# Patient Record
Sex: MALE | Race: WHITE | NOT HISPANIC OR LATINO
[De-identification: names, ages, dates, MRNs, and addresses within clinical notes are randomized per-mention and may not be internally consistent; named-entity substitution may affect disease eponyms.]

---

## 2017-06-16 PROBLEM — Z00.00 ENCOUNTER FOR PREVENTIVE HEALTH EXAMINATION: Status: ACTIVE | Noted: 2017-06-16

## 2017-06-22 ENCOUNTER — APPOINTMENT (OUTPATIENT)
Dept: OPHTHALMOLOGY | Facility: CLINIC | Age: 42
End: 2017-06-22

## 2017-06-22 DIAGNOSIS — H53.8 OTHER VISUAL DISTURBANCES: ICD-10-CM

## 2017-06-22 DIAGNOSIS — R73.03 PREDIABETES.: ICD-10-CM

## 2017-06-22 DIAGNOSIS — F31.30 BIPOLAR DISORDER, CURRENT EPISODE DEPRESSED, MILD OR MODERATE SEVERITY, UNSPECIFIED: ICD-10-CM

## 2017-06-22 DIAGNOSIS — I10 ESSENTIAL (PRIMARY) HYPERTENSION: ICD-10-CM

## 2017-06-22 DIAGNOSIS — Z78.9 OTHER SPECIFIED HEALTH STATUS: ICD-10-CM

## 2017-06-22 DIAGNOSIS — L71.9 ROSACEA, UNSPECIFIED: ICD-10-CM

## 2017-06-22 DIAGNOSIS — N48.30 PRIAPISM, UNSPECIFIED: ICD-10-CM

## 2017-08-04 ENCOUNTER — TRANSCRIPTION ENCOUNTER (OUTPATIENT)
Age: 42
End: 2017-08-04

## 2018-06-15 ENCOUNTER — APPOINTMENT (OUTPATIENT)
Dept: OPHTHALMOLOGY | Facility: CLINIC | Age: 43
End: 2018-06-15
Payer: MEDICARE

## 2018-06-15 DIAGNOSIS — H16.062: ICD-10-CM

## 2018-06-15 DIAGNOSIS — H17.9 UNSPECIFIED CORNEAL SCAR AND OPACITY: ICD-10-CM

## 2018-06-15 PROCEDURE — 92285 EXTERNAL OCULAR PHOTOGRAPHY: CPT

## 2018-06-15 PROCEDURE — 92132 CPTRZD OPH DX IMG ANT SGM: CPT

## 2018-06-15 PROCEDURE — 92014 COMPRE OPH EXAM EST PT 1/>: CPT

## 2018-06-15 RX ORDER — VALACYCLOVIR 1 G/1
1 TABLET, FILM COATED ORAL DAILY
Qty: 30 | Refills: 5 | Status: ACTIVE | COMMUNITY
Start: 2018-06-15 | End: 1900-01-01

## 2018-06-15 RX ORDER — FUROSEMIDE 80 MG/1
TABLET ORAL
Refills: 0 | Status: ACTIVE | COMMUNITY

## 2018-06-15 RX ORDER — DOXYCYCLINE 50 MG/1
50 CAPSULE ORAL
Qty: 1 | Refills: 5 | Status: ACTIVE | COMMUNITY
Start: 2018-06-15 | End: 1900-01-01

## 2018-08-10 ENCOUNTER — MEDICATION RENEWAL (OUTPATIENT)
Age: 43
End: 2018-08-10

## 2018-08-10 RX ORDER — OFLOXACIN 3 MG/ML
0.3 SOLUTION/ DROPS OPHTHALMIC
Qty: 1 | Refills: 3 | Status: ACTIVE | COMMUNITY
Start: 2018-08-10 | End: 1900-01-01

## 2018-08-10 RX ORDER — PREDNISOLONE ACETATE 10 MG/ML
1 SUSPENSION/ DROPS OPHTHALMIC
Qty: 1 | Refills: 3 | Status: ACTIVE | COMMUNITY
Start: 2018-08-10 | End: 1900-01-01

## 2018-08-20 ENCOUNTER — OUTPATIENT (OUTPATIENT)
Dept: OUTPATIENT SERVICES | Facility: HOSPITAL | Age: 43
LOS: 1 days | End: 2018-08-20
Payer: MEDICARE

## 2018-08-20 ENCOUNTER — APPOINTMENT (OUTPATIENT)
Dept: OPHTHALMOLOGY | Facility: HOSPITAL | Age: 43
End: 2018-08-20

## 2018-08-20 DIAGNOSIS — Z98.890 OTHER SPECIFIED POSTPROCEDURAL STATES: Chronic | ICD-10-CM

## 2018-08-20 DIAGNOSIS — Z41.9 ENCOUNTER FOR PROCEDURE FOR PURPOSES OTHER THAN REMEDYING HEALTH STATE, UNSPECIFIED: Chronic | ICD-10-CM

## 2018-08-20 PROCEDURE — 82962 GLUCOSE BLOOD TEST: CPT

## 2018-08-21 ENCOUNTER — APPOINTMENT (OUTPATIENT)
Dept: OPHTHALMOLOGY | Facility: CLINIC | Age: 43
End: 2018-08-21

## 2018-08-24 DIAGNOSIS — H18.12 BULLOUS KERATOPATHY, LEFT EYE: ICD-10-CM

## 2018-08-28 ENCOUNTER — APPOINTMENT (OUTPATIENT)
Dept: OPHTHALMOLOGY | Facility: CLINIC | Age: 43
End: 2018-08-28

## 2018-11-02 PROBLEM — N48.30 PRIAPISM, UNSPECIFIED: Chronic | Status: ACTIVE | Noted: 2018-08-20

## 2018-11-08 ENCOUNTER — OUTPATIENT (OUTPATIENT)
Dept: OUTPATIENT SERVICES | Facility: HOSPITAL | Age: 43
LOS: 1 days | End: 2018-11-08
Payer: MEDICARE

## 2018-11-08 VITALS
HEIGHT: 71 IN | SYSTOLIC BLOOD PRESSURE: 121 MMHG | HEART RATE: 71 BPM | DIASTOLIC BLOOD PRESSURE: 83 MMHG | TEMPERATURE: 98 F | RESPIRATION RATE: 16 BRPM | OXYGEN SATURATION: 97 % | WEIGHT: 315 LBS

## 2018-11-08 DIAGNOSIS — H02.89 OTHER SPECIFIED DISORDERS OF EYELID: ICD-10-CM

## 2018-11-08 DIAGNOSIS — Z98.890 OTHER SPECIFIED POSTPROCEDURAL STATES: Chronic | ICD-10-CM

## 2018-11-08 DIAGNOSIS — L71.9 ROSACEA, UNSPECIFIED: ICD-10-CM

## 2018-11-08 DIAGNOSIS — Z41.9 ENCOUNTER FOR PROCEDURE FOR PURPOSES OTHER THAN REMEDYING HEALTH STATE, UNSPECIFIED: Chronic | ICD-10-CM

## 2018-11-08 DIAGNOSIS — Z01.818 ENCOUNTER FOR OTHER PREPROCEDURAL EXAMINATION: ICD-10-CM

## 2018-11-08 DIAGNOSIS — G47.30 SLEEP APNEA, UNSPECIFIED: ICD-10-CM

## 2018-11-08 DIAGNOSIS — K21.9 GASTRO-ESOPHAGEAL REFLUX DISEASE WITHOUT ESOPHAGITIS: ICD-10-CM

## 2018-11-08 DIAGNOSIS — I10 ESSENTIAL (PRIMARY) HYPERTENSION: ICD-10-CM

## 2018-11-08 LAB
ANION GAP SERPL CALC-SCNC: 13 MMOL/L — SIGNIFICANT CHANGE UP (ref 5–17)
BUN SERPL-MCNC: 10 MG/DL — SIGNIFICANT CHANGE UP (ref 7–23)
CALCIUM SERPL-MCNC: 9.4 MG/DL — SIGNIFICANT CHANGE UP (ref 8.4–10.5)
CHLORIDE SERPL-SCNC: 106 MMOL/L — SIGNIFICANT CHANGE UP (ref 96–108)
CO2 SERPL-SCNC: 23 MMOL/L — SIGNIFICANT CHANGE UP (ref 22–31)
CREAT SERPL-MCNC: 0.88 MG/DL — SIGNIFICANT CHANGE UP (ref 0.5–1.3)
GLUCOSE SERPL-MCNC: 104 MG/DL — HIGH (ref 70–99)
HCT VFR BLD CALC: 44.1 % — SIGNIFICANT CHANGE UP (ref 39–50)
HGB BLD-MCNC: 15.3 G/DL — SIGNIFICANT CHANGE UP (ref 13–17)
MCHC RBC-ENTMCNC: 30.8 PG — SIGNIFICANT CHANGE UP (ref 27–34)
MCHC RBC-ENTMCNC: 34.7 GM/DL — SIGNIFICANT CHANGE UP (ref 32–36)
MCV RBC AUTO: 88.9 FL — SIGNIFICANT CHANGE UP (ref 80–100)
PLATELET # BLD AUTO: 305 K/UL — SIGNIFICANT CHANGE UP (ref 150–400)
POTASSIUM SERPL-MCNC: 4 MMOL/L — SIGNIFICANT CHANGE UP (ref 3.5–5.3)
POTASSIUM SERPL-SCNC: 4 MMOL/L — SIGNIFICANT CHANGE UP (ref 3.5–5.3)
RBC # BLD: 4.96 M/UL — SIGNIFICANT CHANGE UP (ref 4.2–5.8)
RBC # FLD: 13.8 % — SIGNIFICANT CHANGE UP (ref 10.3–14.5)
SODIUM SERPL-SCNC: 142 MMOL/L — SIGNIFICANT CHANGE UP (ref 135–145)
WBC # BLD: 10.54 K/UL — HIGH (ref 3.8–10.5)
WBC # FLD AUTO: 10.54 K/UL — HIGH (ref 3.8–10.5)

## 2018-11-08 PROCEDURE — 80048 BASIC METABOLIC PNL TOTAL CA: CPT

## 2018-11-08 PROCEDURE — 85027 COMPLETE CBC AUTOMATED: CPT

## 2018-11-08 PROCEDURE — G0463: CPT

## 2018-11-08 RX ORDER — SODIUM CHLORIDE 9 MG/ML
3 INJECTION INTRAMUSCULAR; INTRAVENOUS; SUBCUTANEOUS EVERY 8 HOURS
Qty: 0 | Refills: 0 | Status: DISCONTINUED | OUTPATIENT
Start: 2018-11-13 | End: 2018-11-28

## 2018-11-08 RX ORDER — LIDOCAINE HCL 20 MG/ML
0.2 VIAL (ML) INJECTION ONCE
Qty: 0 | Refills: 0 | Status: DISCONTINUED | OUTPATIENT
Start: 2018-11-13 | End: 2018-11-28

## 2018-11-08 NOTE — H&P PST ADULT - PROBLEM SELECTOR PLAN 1
Bilateral upper eyelid resection   PSt instruction given, CBC,BMP drawn sent pending result   Has own appointment with PMD 11/12/2018 for medical evaluation preop will fax to [facility] recent echocardiogram and latest  EKG done from Dr Henry office   Case discussed with Dr Lebron< BMI 53 , ok to do surgery in ambulatory

## 2018-11-08 NOTE — H&P PST ADULT - ENMT COMMENTS
hx eyelid disorder and c/o blurry vision at times surgical scar from trach tube well healed  speech clear

## 2018-11-08 NOTE — H&P PST ADULT - RESPIRATORY AND THORAX COMMENTS
hx of respiratory disorder been intubated with tracheostomy Sept 2017 treated able to recovered been stable since

## 2018-11-08 NOTE — H&P PST ADULT - HISTORY OF PRESENT ILLNESS
42 y/o male with hx of morbid obesity , sleep apnea on CPAP, HTN , Bipolar disorder, GERD, Neuropathy on lower legs, rosecea takes antibiotics, hx of respiratory failure Sept 2017 admitted to hospital was intubated and had tracheostomy September with gastrostomy tube , recovered stayed in rehabilitation center and was discharge January 2018 as per patient. Patient has hx of eyelid discomfort which affects his eyesight , Patient was previously scheduled surgery at [facility] was cancelled. Referred to Dr Paz , re-evaluated the patient and now scheduled this procedure for treatment.

## 2018-11-08 NOTE — H&P PST ADULT - ATTENDING COMMENTS
For bilateral upper eyelid shortening.  Risks,, benefits, options reviewed.  All questions answered.  Risks include bleeding, infection, wound dehisence, need for additional surgery etc.

## 2018-11-08 NOTE — H&P PST ADULT - PMH
Bipolar 1 disorder  on meds  GERD (gastroesophageal reflux disease)  on meds  H/O tracheostomy  Sept 2017 removed x few weeks as per patient  HTN (hypertension)  on meds  Morbid obesity  lost 100 lbs while in the hospital 2017  Priapism    Respiratory failure  hospitalized Aug 2017 - intubated Sept 2017 tracheostomy and gastrostomy tube which was removed stayed at rehab center until Jan 2018 been stable since  Rosacea  takes antibiotics daily  Sleep apnea  on CPAP

## 2018-11-09 PROBLEM — I10 ESSENTIAL (PRIMARY) HYPERTENSION: Chronic | Status: ACTIVE | Noted: 2018-08-20

## 2018-11-09 PROBLEM — L71.9 ROSACEA, UNSPECIFIED: Chronic | Status: ACTIVE | Noted: 2018-08-20

## 2018-11-09 PROBLEM — E11.9 TYPE 2 DIABETES MELLITUS WITHOUT COMPLICATIONS: Chronic | Status: INACTIVE | Noted: 2018-08-20 | Resolved: 2018-11-08

## 2018-11-09 PROBLEM — J96.90 RESPIRATORY FAILURE, UNSPECIFIED, UNSPECIFIED WHETHER WITH HYPOXIA OR HYPERCAPNIA: Chronic | Status: ACTIVE | Noted: 2018-08-20

## 2018-11-09 PROBLEM — F31.9 BIPOLAR DISORDER, UNSPECIFIED: Chronic | Status: ACTIVE | Noted: 2018-08-20

## 2018-11-09 PROBLEM — Z98.890 OTHER SPECIFIED POSTPROCEDURAL STATES: Chronic | Status: ACTIVE | Noted: 2018-08-20

## 2018-11-09 PROBLEM — K57.90 DIVERTICULOSIS OF INTESTINE, PART UNSPECIFIED, WITHOUT PERFORATION OR ABSCESS WITHOUT BLEEDING: Chronic | Status: INACTIVE | Noted: 2018-08-20 | Resolved: 2018-11-08

## 2018-11-09 PROBLEM — G47.30 SLEEP APNEA, UNSPECIFIED: Chronic | Status: ACTIVE | Noted: 2018-08-20

## 2018-11-09 PROBLEM — K63.5 POLYP OF COLON: Chronic | Status: INACTIVE | Noted: 2018-08-20 | Resolved: 2018-11-08

## 2018-11-09 PROBLEM — E66.01 MORBID (SEVERE) OBESITY DUE TO EXCESS CALORIES: Chronic | Status: ACTIVE | Noted: 2018-08-20

## 2018-11-09 PROBLEM — K21.9 GASTRO-ESOPHAGEAL REFLUX DISEASE WITHOUT ESOPHAGITIS: Chronic | Status: ACTIVE | Noted: 2018-08-20

## 2018-11-12 ENCOUNTER — TRANSCRIPTION ENCOUNTER (OUTPATIENT)
Age: 43
End: 2018-11-12

## 2018-11-13 ENCOUNTER — OUTPATIENT (OUTPATIENT)
Dept: OUTPATIENT SERVICES | Facility: HOSPITAL | Age: 43
LOS: 1 days | End: 2018-11-13
Payer: MEDICARE

## 2018-11-13 VITALS
SYSTOLIC BLOOD PRESSURE: 126 MMHG | OXYGEN SATURATION: 94 % | RESPIRATION RATE: 20 BRPM | DIASTOLIC BLOOD PRESSURE: 66 MMHG

## 2018-11-13 VITALS
OXYGEN SATURATION: 97 % | WEIGHT: 315 LBS | RESPIRATION RATE: 18 BRPM | DIASTOLIC BLOOD PRESSURE: 83 MMHG | HEIGHT: 71 IN | SYSTOLIC BLOOD PRESSURE: 161 MMHG | TEMPERATURE: 99 F | HEART RATE: 72 BPM

## 2018-11-13 DIAGNOSIS — Z98.890 OTHER SPECIFIED POSTPROCEDURAL STATES: Chronic | ICD-10-CM

## 2018-11-13 DIAGNOSIS — Z41.9 ENCOUNTER FOR PROCEDURE FOR PURPOSES OTHER THAN REMEDYING HEALTH STATE, UNSPECIFIED: Chronic | ICD-10-CM

## 2018-11-13 DIAGNOSIS — H02.89 OTHER SPECIFIED DISORDERS OF EYELID: ICD-10-CM

## 2018-11-13 PROCEDURE — 67966 REVISION OF EYELID: CPT | Mod: 50

## 2018-11-13 RX ORDER — ONDANSETRON 8 MG/1
4 TABLET, FILM COATED ORAL ONCE
Qty: 0 | Refills: 0 | Status: DISCONTINUED | OUTPATIENT
Start: 2018-11-13 | End: 2018-11-28

## 2018-11-13 RX ORDER — GABAPENTIN 400 MG/1
0 CAPSULE ORAL
Qty: 0 | Refills: 0 | COMMUNITY

## 2018-11-13 RX ORDER — DULOXETINE HYDROCHLORIDE 30 MG/1
1 CAPSULE, DELAYED RELEASE ORAL
Qty: 0 | Refills: 0 | COMMUNITY

## 2018-11-13 RX ORDER — ACETAMINOPHEN 500 MG
1000 TABLET ORAL ONCE
Qty: 0 | Refills: 0 | Status: COMPLETED | OUTPATIENT
Start: 2018-11-13 | End: 2018-11-13

## 2018-11-13 RX ORDER — SODIUM CHLORIDE 9 MG/ML
1000 INJECTION, SOLUTION INTRAVENOUS
Qty: 0 | Refills: 0 | Status: DISCONTINUED | OUTPATIENT
Start: 2018-11-13 | End: 2018-11-28

## 2018-11-13 RX ORDER — LITHIUM CARBONATE 300 MG/1
0 TABLET, EXTENDED RELEASE ORAL
Qty: 0 | Refills: 0 | COMMUNITY

## 2018-11-13 RX ORDER — CELECOXIB 200 MG/1
200 CAPSULE ORAL ONCE
Qty: 0 | Refills: 0 | Status: COMPLETED | OUTPATIENT
Start: 2018-11-13 | End: 2018-11-13

## 2018-11-13 RX ORDER — ATENOLOL 25 MG/1
1 TABLET ORAL
Qty: 0 | Refills: 0 | COMMUNITY

## 2018-11-13 RX ORDER — IBUPROFEN 200 MG
1 TABLET ORAL
Qty: 0 | Refills: 0 | COMMUNITY

## 2018-11-13 RX ORDER — CELECOXIB 200 MG/1
200 CAPSULE ORAL ONCE
Qty: 0 | Refills: 0 | Status: DISCONTINUED | OUTPATIENT
Start: 2018-11-13 | End: 2018-11-28

## 2018-11-13 RX ORDER — FAMOTIDINE 10 MG/ML
0 INJECTION INTRAVENOUS
Qty: 0 | Refills: 0 | COMMUNITY

## 2018-11-13 RX ADMIN — CELECOXIB 200 MILLIGRAM(S): 200 CAPSULE ORAL at 12:06

## 2018-11-13 RX ADMIN — Medication 1000 MILLIGRAM(S): at 12:05

## 2018-12-11 ENCOUNTER — RX RENEWAL (OUTPATIENT)
Age: 43
End: 2018-12-11

## 2021-10-06 PROBLEM — H17.9 RIGHT CORNEAL SCAR: Status: ACTIVE | Noted: 2017-06-22

## 2023-04-12 ENCOUNTER — NON-APPOINTMENT (OUTPATIENT)
Age: 48
End: 2023-04-12

## 2023-04-12 ENCOUNTER — APPOINTMENT (OUTPATIENT)
Dept: OPHTHALMOLOGY | Facility: CLINIC | Age: 48
End: 2023-04-12
Payer: MEDICARE

## 2023-04-12 PROCEDURE — 99204 OFFICE O/P NEW MOD 45 MIN: CPT

## 2023-06-27 ENCOUNTER — APPOINTMENT (OUTPATIENT)
Dept: OPHTHALMOLOGY | Facility: CLINIC | Age: 48
End: 2023-06-27
Payer: MEDICARE

## 2023-06-27 ENCOUNTER — NON-APPOINTMENT (OUTPATIENT)
Age: 48
End: 2023-06-27

## 2023-06-27 PROCEDURE — 92014 COMPRE OPH EXAM EST PT 1/>: CPT

## 2023-11-14 ENCOUNTER — NON-APPOINTMENT (OUTPATIENT)
Age: 48
End: 2023-11-14

## 2023-11-14 ENCOUNTER — APPOINTMENT (OUTPATIENT)
Dept: OPHTHALMOLOGY | Facility: CLINIC | Age: 48
End: 2023-11-14
Payer: MEDICARE

## 2023-11-14 PROCEDURE — 92025 CPTRIZED CORNEAL TOPOGRAPHY: CPT

## 2023-11-14 PROCEDURE — 92285 EXTERNAL OCULAR PHOTOGRAPHY: CPT

## 2023-11-14 PROCEDURE — 92136 OPHTHALMIC BIOMETRY: CPT

## 2023-11-14 PROCEDURE — 92014 COMPRE OPH EXAM EST PT 1/>: CPT | Mod: 25

## 2023-12-08 ENCOUNTER — APPOINTMENT (OUTPATIENT)
Dept: OPHTHALMOLOGY | Facility: CLINIC | Age: 48
End: 2023-12-08
Payer: MEDICARE

## 2023-12-08 ENCOUNTER — NON-APPOINTMENT (OUTPATIENT)
Age: 48
End: 2023-12-08

## 2023-12-08 PROCEDURE — 99215 OFFICE O/P EST HI 40 MIN: CPT | Mod: 25

## 2023-12-08 PROCEDURE — 92285 EXTERNAL OCULAR PHOTOGRAPHY: CPT

## 2024-01-09 ENCOUNTER — APPOINTMENT (OUTPATIENT)
Dept: OPHTHALMOLOGY | Facility: CLINIC | Age: 49
End: 2024-01-09
Payer: MEDICARE

## 2024-01-09 ENCOUNTER — NON-APPOINTMENT (OUTPATIENT)
Age: 49
End: 2024-01-09

## 2024-01-09 PROCEDURE — 92012 INTRM OPH EXAM EST PATIENT: CPT

## 2024-02-27 ENCOUNTER — NON-APPOINTMENT (OUTPATIENT)
Age: 49
End: 2024-02-27

## 2024-02-27 ENCOUNTER — APPOINTMENT (OUTPATIENT)
Dept: OPHTHALMOLOGY | Facility: CLINIC | Age: 49
End: 2024-02-27
Payer: MEDICARE

## 2024-02-27 PROCEDURE — 65450 TREATMENT OF CORNEAL LESION: CPT | Mod: LT

## 2024-02-27 PROCEDURE — 92012 INTRM OPH EXAM EST PATIENT: CPT | Mod: 25

## 2024-02-28 ENCOUNTER — APPOINTMENT (OUTPATIENT)
Dept: OPHTHALMOLOGY | Facility: HOSPITAL | Age: 49
End: 2024-02-28

## 2024-03-07 ENCOUNTER — APPOINTMENT (OUTPATIENT)
Dept: OPHTHALMOLOGY | Facility: CLINIC | Age: 49
End: 2024-03-07

## 2024-03-26 ENCOUNTER — APPOINTMENT (OUTPATIENT)
Dept: OPHTHALMOLOGY | Facility: CLINIC | Age: 49
End: 2024-03-26
Payer: MEDICARE

## 2024-03-26 ENCOUNTER — NON-APPOINTMENT (OUTPATIENT)
Age: 49
End: 2024-03-26

## 2024-03-26 PROCEDURE — 92012 INTRM OPH EXAM EST PATIENT: CPT | Mod: 24

## 2024-08-15 ENCOUNTER — APPOINTMENT (OUTPATIENT)
Dept: OPHTHALMOLOGY | Facility: CLINIC | Age: 49
End: 2024-08-15
Payer: MEDICARE

## 2024-08-15 ENCOUNTER — NON-APPOINTMENT (OUTPATIENT)
Age: 49
End: 2024-08-15

## 2024-08-15 PROCEDURE — 99215 OFFICE O/P EST HI 40 MIN: CPT | Mod: 25

## 2024-08-15 PROCEDURE — 92285 EXTERNAL OCULAR PHOTOGRAPHY: CPT

## 2024-10-16 ENCOUNTER — APPOINTMENT (OUTPATIENT)
Dept: ORTHOPEDIC SURGERY | Facility: CLINIC | Age: 49
End: 2024-10-16

## 2024-10-16 VITALS — WEIGHT: 315 LBS | BODY MASS INDEX: 44.1 KG/M2 | HEIGHT: 71 IN

## 2024-10-16 DIAGNOSIS — G56.22 LESION OF ULNAR NERVE, LEFT UPPER LIMB: ICD-10-CM

## 2024-10-16 PROCEDURE — 20550 NJX 1 TENDON SHEATH/LIGAMENT: CPT | Mod: LT

## 2024-10-16 PROCEDURE — 76942 ECHO GUIDE FOR BIOPSY: CPT | Mod: LT

## 2024-10-16 PROCEDURE — 99204 OFFICE O/P NEW MOD 45 MIN: CPT | Mod: 25

## 2024-10-18 PROBLEM — G56.22 CUBITAL TUNNEL SYNDROME, LEFT: Status: ACTIVE | Noted: 2024-10-18

## 2024-10-22 ENCOUNTER — OUTPATIENT (OUTPATIENT)
Dept: OUTPATIENT SERVICES | Facility: HOSPITAL | Age: 49
LOS: 1 days | End: 2024-10-22

## 2024-10-22 VITALS
RESPIRATION RATE: 16 BRPM | WEIGHT: 315 LBS | HEIGHT: 71 IN | HEART RATE: 86 BPM | SYSTOLIC BLOOD PRESSURE: 149 MMHG | OXYGEN SATURATION: 96 % | TEMPERATURE: 98 F | DIASTOLIC BLOOD PRESSURE: 85 MMHG

## 2024-10-22 DIAGNOSIS — H02.89 OTHER SPECIFIED DISORDERS OF EYELID: ICD-10-CM

## 2024-10-22 DIAGNOSIS — J44.9 CHRONIC OBSTRUCTIVE PULMONARY DISEASE, UNSPECIFIED: ICD-10-CM

## 2024-10-22 DIAGNOSIS — E66.01 MORBID (SEVERE) OBESITY DUE TO EXCESS CALORIES: ICD-10-CM

## 2024-10-22 DIAGNOSIS — Z98.890 OTHER SPECIFIED POSTPROCEDURAL STATES: Chronic | ICD-10-CM

## 2024-10-22 DIAGNOSIS — I10 ESSENTIAL (PRIMARY) HYPERTENSION: ICD-10-CM

## 2024-10-22 DIAGNOSIS — R73.09 OTHER ABNORMAL GLUCOSE: ICD-10-CM

## 2024-10-22 DIAGNOSIS — Z93.1 GASTROSTOMY STATUS: Chronic | ICD-10-CM

## 2024-10-22 DIAGNOSIS — G47.33 OBSTRUCTIVE SLEEP APNEA (ADULT) (PEDIATRIC): ICD-10-CM

## 2024-10-22 DIAGNOSIS — Z41.9 ENCOUNTER FOR PROCEDURE FOR PURPOSES OTHER THAN REMEDYING HEALTH STATE, UNSPECIFIED: Chronic | ICD-10-CM

## 2024-10-22 DIAGNOSIS — H02.004: ICD-10-CM

## 2024-10-22 RX ORDER — SODIUM CHLORIDE 9 MG/ML
3 INJECTION, SOLUTION INTRAMUSCULAR; INTRAVENOUS; SUBCUTANEOUS EVERY 8 HOURS
Refills: 0 | Status: DISCONTINUED | OUTPATIENT
Start: 2024-10-30 | End: 2024-11-13

## 2024-10-22 NOTE — H&P PST ADULT - PROBLEM SELECTOR PLAN 2
pt will take Atenolol AM of surgery as prescribed  pt seen by cardiology preop on 10/15/24 and no active cardiac contraindications

## 2024-10-22 NOTE — H&P PST ADULT - ASSESSMENT
50 y/o male with Morbid Obesity, Elevated A1c, HTN, Severe SUSAN on CPAP, mildly dilated aortic root, ascending aorta dilation, Acid reflux, Bipolar Disorder, Neuropathy, adrenal insufficiency, stable pulmonary nodules, COPD, h/o hypercapnic respiratory failure 7 years ago s/p tracheostomy with reversal and entropion of bilateral eyelids presents to PST preop for bilateral, upper eyelid wedge resection, bilateral upper eyelid entropion repair.

## 2024-10-22 NOTE — H&P PST ADULT - PROBLEM SELECTOR PLAN 1
preop for bilateral, upper eyelid wedge resection, bilateral upper eyelid entropion repair on 10/30/24  preop instructions given, pt verbalized understanding  pt will take prescribed famotidine for GI prophylaxis AM of surgery   labs results from8/2024 reviewed and in chart

## 2024-10-22 NOTE — H&P PST ADULT - OTHER CARE PROVIDERS
Pulmonary Dr. Devon Carcamo  Cardiology Dr. Ekaterina Jerome   Neurologist Dr. Jerry  Endocrinologist Dr. Katz

## 2024-10-22 NOTE — H&P PST ADULT - NEGATIVE NEUROLOGICAL SYMPTOMS
no transient paralysis/no weakness/no generalized seizures/no focal seizures/no syncope/no tremors/no loss of sensation/no difficulty walking/no headache/no loss of consciousness/no hemiparesis/no confusion/no facial palsy
Improved

## 2024-10-22 NOTE — H&P PST ADULT - NSICDXPASTSURGICALHX_GEN_ALL_CORE_FT
PAST SURGICAL HISTORY:  Elective surgery Peg inserted and removed 2017    H/O circumcision     H/O colonoscopy     H/O eye surgery     H/O tracheostomy Sept 2017 / removed x few weeks    S/P percutaneous endoscopic gastrostomy (PEG) tube placement

## 2024-10-22 NOTE — H&P PST ADULT - NSICDXPASTMEDICALHX_GEN_ALL_CORE_FT
PAST MEDICAL HISTORY:  Acid reflux     Ascending aorta dilation     Bipolar 1 disorder on meds    Current smoker     Dilated aortic root     Elevated hemoglobin A1c     GERD (gastroesophageal reflux disease) on meds    H/O adrenal insufficiency     H/O cardiomyopathy     H/O tracheostomy Sept 2017 removed x few weeks as per patient    HTN (hypertension) on meds    Lung nodules     Morbid obesity lost 100 lbs while in the hospital 2017    Neuropathy     Other specified disorders of eyelid     Priapism     Respiratory failure hospitalized Aug 2017 - intubated Sept 2017 tracheostomy and gastrostomy tube which was removed stayed at rehab center until Jan 2018 been stable since    Rosacea takes antibiotics daily    Sleep apnea on CPAP    Unspecified entropion of left eye, unspecified eyelid

## 2024-10-22 NOTE — H&P PST ADULT - RESPIRATORY AND THORAX COMMENTS
h/o mild COPD. On inhalers daily. denies exacerbation. h/o hypercapnic resp failure in 2017 s/p trach with reversal.  CT chest september 2024  showed several stabe pulmonary nodules. + SUSAN on CPAP.

## 2024-10-22 NOTE — H&P PST ADULT - PROBLEM SELECTOR PLAN 4
pt will hold Ozempic 1 week prior to surgery. His last dose was 10/18/24  accu check to be assessed on admission

## 2024-10-22 NOTE — H&P PST ADULT - PROBLEM SELECTOR PLAN 3
Pt will use Trelegy inhaler DOS and albuterol PRN  Pt seen by pulmonary preop on 10/17/24 and pulmonary status is stable

## 2024-10-29 NOTE — ASU PATIENT PROFILE, ADULT - BRADEN SCORE (IF 18 OR LESS ACTIVATE SKIN INJURY RISK INCREASED GUIDELINE), MLM
Danny Max in [facility], BPP 8/8, will update dr davis when she is available after a delivery.   23

## 2024-10-29 NOTE — ASU PATIENT PROFILE, ADULT - FALL HARM RISK - UNIVERSAL INTERVENTIONS
Bed in lowest position, wheels locked, appropriate side rails in place/Call bell, personal items and telephone in reach/Instruct patient to call for assistance before getting out of bed or chair/Non-slip footwear when patient is out of bed/[place] to call system/Physically safe environment - no spills, clutter or unnecessary equipment/Purposeful Proactive Rounding/Room/bathroom lighting operational, light cord in reach

## 2024-10-30 ENCOUNTER — OUTPATIENT (OUTPATIENT)
Dept: OUTPATIENT SERVICES | Facility: HOSPITAL | Age: 49
LOS: 1 days | Discharge: HOME | End: 2024-10-30
Payer: MEDICARE

## 2024-10-30 ENCOUNTER — APPOINTMENT (OUTPATIENT)
Dept: OPHTHALMOLOGY | Facility: HOSPITAL | Age: 49
End: 2024-10-30

## 2024-10-30 ENCOUNTER — TRANSCRIPTION ENCOUNTER (OUTPATIENT)
Age: 49
End: 2024-10-30

## 2024-10-30 VITALS
OXYGEN SATURATION: 94 % | HEART RATE: 79 BPM | DIASTOLIC BLOOD PRESSURE: 97 MMHG | WEIGHT: 315 LBS | HEIGHT: 71 IN | SYSTOLIC BLOOD PRESSURE: 148 MMHG | RESPIRATION RATE: 18 BRPM | TEMPERATURE: 98 F

## 2024-10-30 VITALS
RESPIRATION RATE: 18 BRPM | HEART RATE: 91 BPM | DIASTOLIC BLOOD PRESSURE: 97 MMHG | SYSTOLIC BLOOD PRESSURE: 152 MMHG | OXYGEN SATURATION: 98 %

## 2024-10-30 DIAGNOSIS — Z93.1 GASTROSTOMY STATUS: Chronic | ICD-10-CM

## 2024-10-30 DIAGNOSIS — H02.001: ICD-10-CM

## 2024-10-30 DIAGNOSIS — Z98.890 OTHER SPECIFIED POSTPROCEDURAL STATES: Chronic | ICD-10-CM

## 2024-10-30 DIAGNOSIS — Z41.9 ENCOUNTER FOR PROCEDURE FOR PURPOSES OTHER THAN REMEDYING HEALTH STATE, UNSPECIFIED: Chronic | ICD-10-CM

## 2024-10-30 LAB — GLUCOSE BLDC GLUCOMTR-MCNC: 123 MG/DL — HIGH (ref 70–99)

## 2024-10-30 PROCEDURE — 67921 REPAIR EYELID DEFECT: CPT | Mod: E1,E3

## 2024-10-30 PROCEDURE — 67961 REVISION OF EYELID: CPT | Mod: E1,E3

## 2024-10-30 RX ORDER — BIOTIN 100 %
1 POWDER (GRAM) MISCELLANEOUS
Refills: 0 | DISCHARGE

## 2024-10-30 RX ORDER — FAMOTIDINE 10 MG/ML
1 INJECTION INTRAVENOUS
Refills: 0 | DISCHARGE

## 2024-10-30 RX ORDER — ACETAMINOPHEN 500 MG
2 TABLET ORAL
Refills: 0 | DISCHARGE

## 2024-10-30 RX ORDER — ONDANSETRON HYDROCHLORIDE 2 MG/ML
4 INJECTION, SOLUTION INTRAMUSCULAR; INTRAVENOUS ONCE
Refills: 0 | Status: DISCONTINUED | OUTPATIENT
Start: 2024-10-30 | End: 2024-11-13

## 2024-10-30 RX ORDER — MINOCYCLINE HYDROCHLORIDE 90 MG/1
1 CAPSULE, EXTENDED RELEASE ORAL
Refills: 0 | DISCHARGE

## 2024-10-30 RX ORDER — FENTANYL CITRAT/DEXTROSE 5%/PF 1250MCG/50
50 PATIENT CONTROLLED ANALGESIA SYRINGE INTRAVENOUS
Refills: 0 | Status: DISCONTINUED | OUTPATIENT
Start: 2024-10-30 | End: 2024-10-30

## 2024-10-30 RX ORDER — OXYCODONE HYDROCHLORIDE 30 MG/1
5 TABLET ORAL ONCE
Refills: 0 | Status: DISCONTINUED | OUTPATIENT
Start: 2024-10-30 | End: 2024-10-30

## 2024-10-30 RX ORDER — CYANOCOBALAMIN (VITAMIN B-12) 1000MCG/15
0 LIQUID (ML) ORAL
Refills: 0 | DISCHARGE

## 2024-10-30 RX ORDER — SEMAGLUTIDE 1.34 MG/ML
0.5 INJECTION, SOLUTION SUBCUTANEOUS
Refills: 0 | DISCHARGE

## 2024-10-30 RX ORDER — GABAPENTIN 300 MG/1
1 CAPSULE ORAL
Refills: 0 | DISCHARGE

## 2024-10-30 RX ORDER — OMEGA-3/EPA/FISH OIL 910-1300MG
0 CAPSULE,DELAYED RELEASE (ENTERIC COATED) ORAL
Refills: 0 | DISCHARGE

## 2024-10-30 RX ORDER — ALBUTEROL 90 MCG
2 AEROSOL (GRAM) INHALATION
Refills: 0 | DISCHARGE

## 2024-10-30 RX ORDER — CHOLECALCIFEROL (VITAMIN D3) 625 MCG
0 CAPSULE ORAL
Refills: 0 | DISCHARGE

## 2024-10-30 RX ORDER — CYCLOSPORINE 0.05 %
1 DROPPERETTE, SINGLE-USE DROP DISPENSER OPHTHALMIC (EYE)
Refills: 0 | DISCHARGE

## 2024-10-30 RX ORDER — HYDROMORPHONE HCL/0.9% NACL/PF 6 MG/30 ML
0.5 PATIENT CONTROLLED ANALGESIA SYRINGE INTRAVENOUS
Refills: 0 | Status: DISCONTINUED | OUTPATIENT
Start: 2024-10-30 | End: 2024-10-30

## 2024-10-30 RX ORDER — TESTOSTERONE CYPIONATE 200 MG/ML
0 INJECTION, SOLUTION INTRAMUSCULAR
Refills: 0 | DISCHARGE

## 2024-10-30 RX ORDER — FLUTICASONE FUROATE, UMECLIDINIUM BROMIDE AND VILANTEROL TRIFENATATE 100; 62.5; 25 UG/1; UG/1; UG/1
1 POWDER RESPIRATORY (INHALATION)
Refills: 0 | DISCHARGE

## 2024-10-30 RX ORDER — PSYLLIUM SEED
3.4 POWDER (GRAM) ORAL
Refills: 0 | DISCHARGE

## 2024-10-30 RX ORDER — ARIPIPRAZOLE 2 MG/1
1 TABLET ORAL
Refills: 0 | DISCHARGE

## 2024-10-30 RX ADMIN — OXYCODONE HYDROCHLORIDE 5 MILLIGRAM(S): 30 TABLET ORAL at 16:13

## 2024-10-30 RX ADMIN — OXYCODONE HYDROCHLORIDE 5 MILLIGRAM(S): 30 TABLET ORAL at 16:50

## 2024-10-30 NOTE — ASU DISCHARGE PLAN (ADULT/PEDIATRIC) - ASU DC SPECIAL INSTRUCTIONSFT
Postop Instructions for Eyelid Surgery – Dr. Jarrett     After surgery instructions     - For the remainder of the surgery day and the day after surgery, apply ice to the eyes for at least 20 minutes out of every hour to reduce bruising. Any kind of cold pack or cold compress is fine (for example: ice cubes in a baggie, towel dipped in ice water, frozen gel pack). The more ice the better—you can’t overdo it!   - Apply the prescribed ointment to your stitches 4 times a day (breakfast, lunch, dinner, and before bed).   - Take all your regular medications and eye drops as usual.   - If you were advised to stop a blood thinner prior to surgery, start it again the evening after surgery unless other special directions are given.   - It may help minimize swelling to sleep with your head slightly elevated on a pillow   - You may shower the day after surgery. It is okay to get your stitches wet and soapy. Do not rub the stitches. Simply let soap and water run over the area and pat it gently dry. If there is any crustiness caught in the stitches you can remove it gently with a damp Q-tip.   - On the second day after surgery, you can stop using ice and start using warm compresses if desired to reduce swelling   - You may take a gentle walk, climb stairs, and do light household chores as normal   - Avoid exercise, lifting heavy weights (>10 pounds), straining to defecate, or other strenuous activities that raise your heart rate or blood pressure for 7 days after surgery.    - Avoid doing any dirty, anna activities or chores for 7 days after surgery.   - Do not submerge your stitches in any lake, ocean, pool, or hot tub (or any other shared water) for 14 days after surgery.   - Keep your incisions out of the sun for at least 6 months.         What is normal after surgery:     - It is normal to have a little bit of blood oozing around the stitches. You can simply wipe it away with a clean tissue or towel.   - It is normal for your vision to be slightly blurry. This is usually due to ointment getting in the eye.   - It is normal to have pink or bloody tears   - It is normal to have significant bruising and swelling around the eyes, even to the point of having two black eyes. It is normal for the bruising to move downwards to your lower eyelids and cheeks with gravity, even if you did not have surgery on the lower eyelids. The bruising and the majority of swelling usually resolves in about 2 weeks. However, residual swelling can linger for weeks to months.   - Your eyelids or eyelashes may feel numb for several weeks after surgery.   - It is normal for the eyes to feel especially dry, scratchy, teary, or itchy in the first few weeks after surgery. You may use artificial tear drops (over the counter) as needed to soothe the eyes.          When to call the doctor:     - There is a sudden increase in bruising and swelling or the eyelid is so swollen and hard that you cannot pry the eye open with your hands—THIS IS AN EMERGENCY   - If there is severe vision loss and all you can see is shapes or black—THIS IS AN EMERGENCY   - There is bleeding from the stitches that does not stop after holding firm pressure with a clean towel for 10 minutes without peeking   - You have severe pain that is not relieved by ice and 2 acetaminophen tablets   - You have severe pain in the eye and it feels like a stitch is constantly poking you in the eye.        Office phone:      [place] [phone]     [place]: [phone]     Our office phones are answered 24 hours a day. After business hours, please identify yourself as a patient who just had surgery and ask to speak to the doctor on call.      Dr. Jarrett’s work cell phone: [phone] Emergencies only, please Postop Instructions for Eyelid Surgery – Dr. Jarrett     After surgery instructions     - For the remainder of the surgery day and the day after surgery, apply ice to the eyes for at least 20 minutes out of every hour to reduce bruising. Any kind of cold pack or cold compress is fine (for example: ice cubes in a baggie, towel dipped in ice water, frozen gel pack). The more ice the better—you can’t overdo it!   - Apply the prescribed ointment to your stitches and inside the eye 4 times a day (breakfast, lunch, dinner, and before bed).   - Take all your regular medications and eye drops as usual.   - If you were advised to stop a blood thinner prior to surgery, start it again the evening after surgery unless other special directions are given.   - It may help minimize swelling to sleep with your head slightly elevated on a pillow   - You may shower the day after surgery. It is okay to get your stitches wet and soapy. Do not rub the stitches. Simply let soap and water run over the area and pat it gently dry. If there is any crustiness caught in the stitches you can remove it gently with a damp Q-tip.   - On the second day after surgery, you can stop using ice and start using warm compresses if desired to reduce swelling   - You may take a gentle walk, climb stairs, and do light household chores as normal   - Avoid exercise, lifting heavy weights (>10 pounds), straining to defecate, or other strenuous activities that raise your heart rate or blood pressure for 7 days after surgery.    - Avoid doing any dirty, anna activities or chores for 7 days after surgery.   - Do not submerge your stitches in any lake, ocean, pool, or hot tub (or any other shared water) for 14 days after surgery.   - Keep your incisions out of the sun for at least 6 months.         What is normal after surgery:     - It is normal to have a little bit of blood oozing around the stitches. You can simply wipe it away with a clean tissue or towel.   - It is normal for your vision to be slightly blurry. This is usually due to ointment getting in the eye.   - It is normal to have pink or bloody tears   - It is normal to have significant bruising and swelling around the eyes, even to the point of having two black eyes. It is normal for the bruising to move downwards to your lower eyelids and cheeks with gravity, even if you did not have surgery on the lower eyelids. The bruising and the majority of swelling usually resolves in about 2 weeks. However, residual swelling can linger for weeks to months.   - Your eyelids or eyelashes may feel numb for several weeks after surgery.   - It is normal for the eyes to feel especially dry, scratchy, teary, or itchy in the first few weeks after surgery. You may use artificial tear drops (over the counter) as needed to soothe the eyes.          When to call the doctor:     - There is a sudden increase in bruising and swelling or the eyelid is so swollen and hard that you cannot pry the eye open with your hands—THIS IS AN EMERGENCY   - If there is severe vision loss and all you can see is shapes or black—THIS IS AN EMERGENCY   - There is bleeding from the stitches that does not stop after holding firm pressure with a clean towel for 10 minutes without peeking   - You have severe pain that is not relieved by ice and 2 acetaminophen tablets   - You have severe pain in the eye and it feels like a stitch is constantly poking you in the eye.        Office phone:      [place] [phone]     [place]: [phone]     Our office phones are answered 24 hours a day. After business hours, please identify yourself as a patient who just had surgery and ask to speak to the doctor on call.      Dr. Jarrett’s work cell phone: [phone] Emergencies only, please

## 2024-10-30 NOTE — ASU DISCHARGE PLAN (ADULT/PEDIATRIC) - PROVIDER TOKENS
PROVIDER:[TOKEN:[[id number]],SCHEDULEDAPPT:[11/08/2024],SCHEDULEDAPPTTIME:[11:00 AM],ESTABLISHEDPATIENT:[T]]

## 2024-10-30 NOTE — OPERATIVE REPORT - OPERATIVE RPOSRT DETAILS
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  He had previously undergone bilateral upper eyelid entropion repair via wedge excisions.  However, these excision sites had dehisced resulting in defects in both upper eyelid margins.  In addition, the entropion had recurred in both upper eyelids due to a combination of lid laxity, lash ptosis, as well as overriding skin, orbicularis, and orbital fat.  I recommended bilateral upper lid wedge excision of the defects, combined with a extensive entropion repair encompassing lash ptosis repair and removal of excess skin orbicularis and orbital fat.  I carefully discussed the risk benefits and alternatives of the procedure with the patient.  The risks include but are not limited to pain, bruising, bleeding, swelling, infection, bleeding, scarring, eyelid malposition, asymmetry, recurrence, wound dehiscence, need for revision or additional surgery, dry eye, tearing, and in rare instances damage to globe or loss of vision.  The patient indicated understanding elected to proceed.    Prior to the surgery we extensively discussed several risk factors that the patient has for poor wound healing including cigarette smoking as well as the risk for recurrence due to the floppy eyelid syndrome.  The patient agreed to stop smoking for at least 2 weeks in the perioperative period and understands that he is at risk for needing more surgery due to the floppy eyelid syndrome.    Description of procedure: Patient was identified in the preoperative holding area.  Both eyes were marked.  Written informed consent was obtained.  He was brought to the operating room and positioned supine.  Light sedation was established by the hospital anesthesia team.  Bilateral upper eyelid blepharoplasty  incisions were marked as well as the extent of the eyelid defects.   Local anesthetic consisting of 2% lidocaine with 1 to 100,000 parts epinephrine and 0.5% bupivacaine with 1 to 200,000 parts epinephrine  were injected into both upper lids.  The patient's face was prepped and draped in the usual sterile fashion for oculoplastic surgery.  Prior to being the procedure a timeout was performed confirming the correct patient, procedure, and laterality.  Corneal shields were placed in both eyes.    Attention was first turned to the right upper lid.  The previously marked blepharoplasty incision was made with needle tip monopolar cautery.  The skin flap was removed as well as excess overriding orbicularis oculi muscle.  The orbital septum was opened medially and excess orbital fat was conservatively trimmed.    Next 1 mm stab incisions were made 1 mm superior to the lashes along the horizontal extent of the upper lid.  Lash ptosis repair sutures were placed through the stab incision in the pretarsal plane to engage the conjunctiva superior to the tarsal plate suture was then reversed and tied within the stab incision.    Next pin tag in a wedge excision was made of the eyelid defect.  the eyelid margin was closed with a 5-0 Vicryl buried vertical mattress suture.  The tarsal plate was then closed with interrupted partial-thickness 5-0 Vicryl sutures.  The lash line was then closed with 7-0 Vicryl vertical mattress sutures.  The anterior lamella was close–0 Vicryl and 6-0 plain gut sutures.    The upper lid blepharoplasty incision was then closed with running 6-0 plain gut.    Attention was then turned to the left upper lid where the same procedure was were performed.    At the conclusion of the surgery, the patient's face was washed with sterile saline, the corneal shields were removed, and ophthalmic antibiotic ointment was applied.  The patient was aroused from sedation and taken to PACU in stable condition.  There were no complications.

## 2024-10-30 NOTE — OPERATIVE REPORT - NSICDXBRIEFOPLAUNCH_GEN_ALL_CORE
<--- Click to Launch ICDx for PreOp, PostOp and Procedure Will route to Dr. Thayer nurse as we do not give medication lists to dental offices in my department. Nikky Umaña RN  ....................  4/16/2021   2:08 PM

## 2024-10-30 NOTE — ASU PREOP CHECKLIST - 1.
FS  @ 10:32  @ 10:32; ozempic last taken 10/18/24. HA1C 6.9  @ 10:32; Ozempic last taken 10/18/24. HA1C 6.9

## 2024-10-30 NOTE — ASU DISCHARGE PLAN (ADULT/PEDIATRIC) - CARE PROVIDER_API CALL
Cassandra Jarrett  Ophthalmology  [address] [place] [address], [address]  [place] [address]  Phone: [phone]  Fax: [phone]  Established Patient  Scheduled Appointment: 11/08/2024 11:00 AM

## 2024-10-30 NOTE — BRIEF OPERATIVE NOTE - NSICDXBRIEFPROCEDURE_GEN_ALL_CORE_FT
PROCEDURES:  Repair of entropion of both eyes using tarsal wedge excision technique 30-Oct-2024 13:04:50 bilateral upper eyelids Cleo Desai  Suture entropion repair 30-Oct-2024 13:06:27 bilateral upper eyelids Cleo Desai   PROCEDURES:  Suture entropion repair 30-Oct-2024 13:06:27 bilateral upper eyelids Cleo Desai  Excision, lesion, eyelid, with defect repair involving more than one-fourth of eyelid margin 30-Oct-2024 16:02:51  Cleo Desai

## 2024-10-30 NOTE — OPERATIVE REPORT - NSICDXBRIEFPROCEDURE_GEN_ALL_CORE_FT
PROCEDURES:  Excision, lesion, eyelid, with defect repair involving more than one-fourth of eyelid margin 30-Oct-2024 16:02:51  Cleo Desai  Repair, entropion, using extensive blepharoplasty 30-Oct-2024 16:08:02  Cassandra Jarrett

## 2024-10-30 NOTE — BRIEF OPERATIVE NOTE - NSICDXBRIEFPOSTOP_GEN_ALL_CORE_FT
POST-OP DIAGNOSIS:  Entropion of both eyes 30-Oct-2024 13:00:25 bilateral upper eyelids Cleo Desai

## 2024-10-30 NOTE — ASU DISCHARGE PLAN (ADULT/PEDIATRIC) - MEDICATION INSTRUCTIONS
tylenol as needed You were given 1000mg IV Tylenol for pain management.  Please DO NOT take any Tylenol containing products, such as  Vicodin, Percocet, Excedrin, many cold preparations for the next 6 hours (until 945p).  DO NOT EXCEED 4000MG OF TYLENOL OVER 24 HOURS.

## 2024-10-30 NOTE — BRIEF OPERATIVE NOTE - NSICDXBRIEFPREOP_GEN_ALL_CORE_FT
PRE-OP DIAGNOSIS:  Entropion of both eyes 30-Oct-2024 13:00:03 bilateral upper eyelids Cleo Desai   PRE-OP DIAGNOSIS:  Entropion of both eyes 30-Oct-2024 16:02:30  Cleo Desai

## 2024-10-30 NOTE — ASU DISCHARGE PLAN (ADULT/PEDIATRIC) - FINANCIAL ASSISTANCE
[facility] provides services at a reduced cost to those who are determined to be eligible through [facility]’s financial assistance program. Information regarding [facility]’s financial assistance program can be found by going to [email or web address] or by calling [phone].

## 2024-10-30 NOTE — ASU DISCHARGE PLAN (ADULT/PEDIATRIC) - FOLLOW UP APPOINTMENTS
561 may also call Recovery Room (PACU) 24/7 @ [phone]/[facility]

## 2024-10-30 NOTE — ASU DISCHARGE PLAN (ADULT/PEDIATRIC) - PROCEDURE
bilateral entropion repair using tarsal wedge excision and suture technique bilateral full thickness wedge excision and entropion repair using suture technique

## 2024-10-30 NOTE — OPERATIVE REPORT - NSICDXBRIEFPOSTOP_GEN_ALL_CORE_FT
POST-OP DIAGNOSIS:  Other specified disorders of eyelid 30-Oct-2024 16:10:00  Cassandra Jarrett  Senile entropion of left upper eyelid 30-Oct-2024 16:10:09  Cassandra Jarrett  Senile entropion of right upper eyelid 30-Oct-2024 16:10:19  Cassandra Jarrett

## 2024-11-08 ENCOUNTER — APPOINTMENT (OUTPATIENT)
Dept: OPHTHALMOLOGY | Facility: CLINIC | Age: 49
End: 2024-11-08
Payer: MEDICARE

## 2024-11-08 ENCOUNTER — NON-APPOINTMENT (OUTPATIENT)
Age: 49
End: 2024-11-08

## 2024-11-08 PROBLEM — I77.810 THORACIC AORTIC ECTASIA: Chronic | Status: ACTIVE | Noted: 2024-10-22

## 2024-11-08 PROBLEM — R91.8 OTHER NONSPECIFIC ABNORMAL FINDING OF LUNG FIELD: Chronic | Status: ACTIVE | Noted: 2024-10-22

## 2024-11-08 PROBLEM — K21.9 GASTRO-ESOPHAGEAL REFLUX DISEASE WITHOUT ESOPHAGITIS: Chronic | Status: ACTIVE | Noted: 2024-10-22

## 2024-11-08 PROBLEM — Z86.39 PERSONAL HISTORY OF OTHER ENDOCRINE, NUTRITIONAL AND METABOLIC DISEASE: Chronic | Status: ACTIVE | Noted: 2024-10-22

## 2024-11-08 PROBLEM — R73.09 OTHER ABNORMAL GLUCOSE: Chronic | Status: ACTIVE | Noted: 2024-10-22

## 2024-11-08 PROBLEM — H02.006: Chronic | Status: ACTIVE | Noted: 2024-10-22

## 2024-11-08 PROBLEM — H02.89 OTHER SPECIFIED DISORDERS OF EYELID: Chronic | Status: ACTIVE | Noted: 2024-10-22

## 2024-11-08 PROBLEM — F17.200 NICOTINE DEPENDENCE, UNSPECIFIED, UNCOMPLICATED: Chronic | Status: ACTIVE | Noted: 2024-10-22

## 2024-11-08 PROBLEM — Z86.79 PERSONAL HISTORY OF OTHER DISEASES OF THE CIRCULATORY SYSTEM: Chronic | Status: ACTIVE | Noted: 2024-10-22

## 2024-11-08 PROBLEM — G62.9 POLYNEUROPATHY, UNSPECIFIED: Chronic | Status: ACTIVE | Noted: 2024-10-22

## 2024-11-08 PROCEDURE — 92285 EXTERNAL OCULAR PHOTOGRAPHY: CPT

## 2024-11-08 PROCEDURE — 99024 POSTOP FOLLOW-UP VISIT: CPT

## 2024-11-08 NOTE — ASU DISCHARGE PLAN (ADULT/PEDIATRIC). - ***IN THE EVENT THAT YOU DEVELOP A COMPLICATION AND YOU ARE UNABLE TO REACH YOUR OWN PHYSICIAN, YOU MAY CONTACT:
Statement Selected Bed/Stretcher in lowest position, wheels locked, appropriate side rails in place/Call bell, personal items and telephone in reach/Instruct patient to call for assistance before getting out of bed/chair/stretcher/Non-slip footwear applied when patient is off stretcher/[place] to call system/Physically safe environment - no spills, clutter or unnecessary equipment/Purposeful proactive rounding/Room/bathroom lighting operational, light cord in reach

## 2024-11-18 ENCOUNTER — APPOINTMENT (OUTPATIENT)
Dept: OPHTHALMOLOGY | Facility: CLINIC | Age: 49
End: 2024-11-18
Payer: MEDICARE

## 2024-11-18 ENCOUNTER — NON-APPOINTMENT (OUTPATIENT)
Age: 49
End: 2024-11-18

## 2024-11-18 PROCEDURE — 92012 INTRM OPH EXAM EST PATIENT: CPT

## 2024-11-21 ENCOUNTER — NON-APPOINTMENT (OUTPATIENT)
Age: 49
End: 2024-11-21

## 2024-11-21 ENCOUNTER — APPOINTMENT (OUTPATIENT)
Dept: OPHTHALMOLOGY | Facility: CLINIC | Age: 49
End: 2024-11-21
Payer: MEDICARE

## 2024-11-21 PROCEDURE — 99024 POSTOP FOLLOW-UP VISIT: CPT

## 2025-02-20 ENCOUNTER — APPOINTMENT (OUTPATIENT)
Dept: OPHTHALMOLOGY | Facility: CLINIC | Age: 50
End: 2025-02-20

## 2025-04-18 ENCOUNTER — OUTPATIENT (OUTPATIENT)
Dept: OUTPATIENT SERVICES | Facility: HOSPITAL | Age: 50
LOS: 1 days | End: 2025-04-18
Payer: COMMERCIAL

## 2025-04-18 VITALS
RESPIRATION RATE: 15 BRPM | WEIGHT: 315 LBS | HEIGHT: 69 IN | TEMPERATURE: 98 F | HEART RATE: 78 BPM | DIASTOLIC BLOOD PRESSURE: 84 MMHG | OXYGEN SATURATION: 95 % | SYSTOLIC BLOOD PRESSURE: 152 MMHG

## 2025-04-18 DIAGNOSIS — Z01.818 ENCOUNTER FOR OTHER PREPROCEDURAL EXAMINATION: ICD-10-CM

## 2025-04-18 DIAGNOSIS — Z98.890 OTHER SPECIFIED POSTPROCEDURAL STATES: Chronic | ICD-10-CM

## 2025-04-18 DIAGNOSIS — E66.01 MORBID (SEVERE) OBESITY DUE TO EXCESS CALORIES: ICD-10-CM

## 2025-04-18 DIAGNOSIS — Z41.9 ENCOUNTER FOR PROCEDURE FOR PURPOSES OTHER THAN REMEDYING HEALTH STATE, UNSPECIFIED: Chronic | ICD-10-CM

## 2025-04-18 DIAGNOSIS — Z93.1 GASTROSTOMY STATUS: Chronic | ICD-10-CM

## 2025-04-18 DIAGNOSIS — G47.30 SLEEP APNEA, UNSPECIFIED: ICD-10-CM

## 2025-04-18 DIAGNOSIS — R79.89 OTHER SPECIFIED ABNORMAL FINDINGS OF BLOOD CHEMISTRY: ICD-10-CM

## 2025-04-18 DIAGNOSIS — R71.8 OTHER ABNORMALITY OF RED BLOOD CELLS: ICD-10-CM

## 2025-04-18 PROBLEM — H02.006: Chronic | Status: INACTIVE | Noted: 2024-10-22 | Resolved: 2025-04-18

## 2025-04-18 PROBLEM — K21.9 GASTRO-ESOPHAGEAL REFLUX DISEASE WITHOUT ESOPHAGITIS: Chronic | Status: INACTIVE | Noted: 2024-10-22 | Resolved: 2025-04-18

## 2025-04-18 LAB
A1C WITH ESTIMATED AVERAGE GLUCOSE RESULT: 6.4 % — HIGH (ref 4–5.6)
ALBUMIN SERPL ELPH-MCNC: 3.6 G/DL — SIGNIFICANT CHANGE UP (ref 3.3–5)
ALP SERPL-CCNC: 76 U/L — SIGNIFICANT CHANGE UP (ref 30–120)
ALT FLD-CCNC: 30 U/L — SIGNIFICANT CHANGE UP (ref 10–60)
ANION GAP SERPL CALC-SCNC: 7 MMOL/L — SIGNIFICANT CHANGE UP (ref 5–17)
AST SERPL-CCNC: 23 U/L — SIGNIFICANT CHANGE UP (ref 10–40)
BILIRUB SERPL-MCNC: 0.5 MG/DL — SIGNIFICANT CHANGE UP (ref 0.2–1.2)
BUN SERPL-MCNC: 14 MG/DL — SIGNIFICANT CHANGE UP (ref 7–23)
CALCIUM SERPL-MCNC: 8.9 MG/DL — SIGNIFICANT CHANGE UP (ref 8.4–10.5)
CHLORIDE SERPL-SCNC: 104 MMOL/L — SIGNIFICANT CHANGE UP (ref 96–108)
CO2 SERPL-SCNC: 27 MMOL/L — SIGNIFICANT CHANGE UP (ref 22–31)
CREAT SERPL-MCNC: 1.25 MG/DL — SIGNIFICANT CHANGE UP (ref 0.5–1.3)
EGFR: 70 ML/MIN/1.73M2 — SIGNIFICANT CHANGE UP
EGFR: 70 ML/MIN/1.73M2 — SIGNIFICANT CHANGE UP
ESTIMATED AVERAGE GLUCOSE: 137 MG/DL — HIGH (ref 68–114)
GLUCOSE SERPL-MCNC: 117 MG/DL — HIGH (ref 70–99)
HCT VFR BLD CALC: 59.9 % — CRITICAL HIGH (ref 39–50)
HGB BLD-MCNC: 19.9 G/DL — CRITICAL HIGH (ref 13–17)
MCHC RBC-ENTMCNC: 30.3 PG — SIGNIFICANT CHANGE UP (ref 27–34)
MCHC RBC-ENTMCNC: 33.2 G/DL — SIGNIFICANT CHANGE UP (ref 32–36)
MCV RBC AUTO: 91.2 FL — SIGNIFICANT CHANGE UP (ref 80–100)
NRBC BLD AUTO-RTO: 0 /100 WBCS — SIGNIFICANT CHANGE UP (ref 0–0)
PLATELET # BLD AUTO: 271 K/UL — SIGNIFICANT CHANGE UP (ref 150–400)
POTASSIUM SERPL-MCNC: 4.1 MMOL/L — SIGNIFICANT CHANGE UP (ref 3.5–5.3)
POTASSIUM SERPL-SCNC: 4.1 MMOL/L — SIGNIFICANT CHANGE UP (ref 3.5–5.3)
PROT SERPL-MCNC: 7.5 G/DL — SIGNIFICANT CHANGE UP (ref 6–8.3)
RBC # BLD: 6.57 M/UL — HIGH (ref 4.2–5.8)
RBC # FLD: 17.4 % — HIGH (ref 10.3–14.5)
SODIUM SERPL-SCNC: 138 MMOL/L — SIGNIFICANT CHANGE UP (ref 135–145)
WBC # BLD: 8.68 K/UL — SIGNIFICANT CHANGE UP (ref 3.8–10.5)
WBC # FLD AUTO: 8.68 K/UL — SIGNIFICANT CHANGE UP (ref 3.8–10.5)

## 2025-04-18 PROCEDURE — 93005 ELECTROCARDIOGRAM TRACING: CPT

## 2025-04-18 PROCEDURE — 36415 COLL VENOUS BLD VENIPUNCTURE: CPT

## 2025-04-18 PROCEDURE — 85027 COMPLETE CBC AUTOMATED: CPT

## 2025-04-18 PROCEDURE — 93010 ELECTROCARDIOGRAM REPORT: CPT

## 2025-04-18 PROCEDURE — G0463: CPT

## 2025-04-18 PROCEDURE — 80053 COMPREHEN METABOLIC PANEL: CPT

## 2025-04-18 PROCEDURE — 83036 HEMOGLOBIN GLYCOSYLATED A1C: CPT

## 2025-04-18 NOTE — H&P PST ADULT - NSICDXPASTMEDICALHX_GEN_ALL_CORE_FT
PAST MEDICAL HISTORY:  Ascending aorta dilation     Bipolar 1 disorder on meds    COPD, mild     Current smoker     Dilated aortic root     Elevated hemoglobin A1c     GERD (gastroesophageal reflux disease) on meds    H/O adrenal insufficiency     H/O cardiomyopathy     HTN (hypertension) on meds    Lung nodules     Morbid obesity lost 100 lbs while in the hospital 2017    Neuropathy     Other specified disorders of eyelid     Priapism     Respiratory failure hospitalized Aug 2017 - intubated Sept 2017 tracheostomy and gastrostomy tube which was removed stayed at rehab center until Jan 2018 been stable since    Rosacea takes antibiotics daily    Sleep apnea on CPAP

## 2025-04-18 NOTE — H&P PST ADULT - PROBLEM SELECTOR PLAN 5
need note from endocrine re whether to stop testosterone and/ot terbutaline before the surgery need note from urologist  re whether to stop testosterone and/ot terbutaline before the surgery

## 2025-04-18 NOTE — H&P PST ADULT - HISTORY OF PRESENT ILLNESS
this is a 50 y/o male who has had numbness left hand for about 1 yr and 4 months, to have left cubital  tunnel release

## 2025-04-18 NOTE — H&P PST ADULT - NSICDXFAMILYHX_GEN_ALL_CORE_FT
FAMILY HISTORY:  Father  Still living? No  FH: lung cancer, Age at diagnosis: Age Unknown    Mother  Still living? Yes, Estimated age: 81-90  FH: diabetes mellitus, Age at diagnosis: Age Unknown  FH: HTN (hypertension), Age at diagnosis: Age Unknown    Sibling  Still living? Yes, Estimated age: 41-50  FHx: multiple sclerosis, Age at diagnosis: Age Unknown

## 2025-04-25 PROBLEM — J44.9 CHRONIC OBSTRUCTIVE PULMONARY DISEASE, UNSPECIFIED: Chronic | Status: ACTIVE | Noted: 2025-04-18

## 2025-05-02 ENCOUNTER — TRANSCRIPTION ENCOUNTER (OUTPATIENT)
Age: 50
End: 2025-05-02

## 2025-05-02 ENCOUNTER — OUTPATIENT (OUTPATIENT)
Dept: OUTPATIENT SERVICES | Facility: HOSPITAL | Age: 50
LOS: 1 days | End: 2025-05-02
Payer: COMMERCIAL

## 2025-05-02 VITALS
OXYGEN SATURATION: 93 % | HEIGHT: 71 IN | WEIGHT: 315 LBS | HEART RATE: 78 BPM | TEMPERATURE: 97 F | SYSTOLIC BLOOD PRESSURE: 138 MMHG | RESPIRATION RATE: 22 BRPM | DIASTOLIC BLOOD PRESSURE: 86 MMHG

## 2025-05-02 VITALS
SYSTOLIC BLOOD PRESSURE: 123 MMHG | HEART RATE: 78 BPM | RESPIRATION RATE: 20 BRPM | TEMPERATURE: 98 F | DIASTOLIC BLOOD PRESSURE: 65 MMHG | OXYGEN SATURATION: 94 %

## 2025-05-02 DIAGNOSIS — Z41.9 ENCOUNTER FOR PROCEDURE FOR PURPOSES OTHER THAN REMEDYING HEALTH STATE, UNSPECIFIED: Chronic | ICD-10-CM

## 2025-05-02 DIAGNOSIS — Z98.890 OTHER SPECIFIED POSTPROCEDURAL STATES: Chronic | ICD-10-CM

## 2025-05-02 DIAGNOSIS — Z01.818 ENCOUNTER FOR OTHER PREPROCEDURAL EXAMINATION: ICD-10-CM

## 2025-05-02 DIAGNOSIS — G56.22 LESION OF ULNAR NERVE, LEFT UPPER LIMB: ICD-10-CM

## 2025-05-02 DIAGNOSIS — Z93.1 GASTROSTOMY STATUS: Chronic | ICD-10-CM

## 2025-05-02 LAB — GLUCOSE BLDC GLUCOMTR-MCNC: 134 MG/DL — HIGH (ref 70–99)

## 2025-05-02 PROCEDURE — 64721 CARPAL TUNNEL SURGERY: CPT | Mod: LT

## 2025-05-02 PROCEDURE — 82962 GLUCOSE BLOOD TEST: CPT

## 2025-05-02 RX ORDER — HYDROMORPHONE/SOD CHLOR,ISO/PF 2 MG/10 ML
0.5 SYRINGE (ML) INJECTION
Refills: 0 | Status: DISCONTINUED | OUTPATIENT
Start: 2025-05-02 | End: 2025-05-02

## 2025-05-02 RX ORDER — CEFAZOLIN SODIUM IN 0.9 % NACL 3 G/100 ML
3000 INTRAVENOUS SOLUTION, PIGGYBACK (ML) INTRAVENOUS ONCE
Refills: 0 | Status: COMPLETED | OUTPATIENT
Start: 2025-05-02 | End: 2025-05-02

## 2025-05-02 RX ORDER — ONDANSETRON HCL/PF 4 MG/2 ML
4 VIAL (ML) INJECTION ONCE
Refills: 0 | Status: ACTIVE | OUTPATIENT
Start: 2025-05-02 | End: 2026-03-31

## 2025-05-02 RX ORDER — OXYCODONE HYDROCHLORIDE 30 MG/1
5 TABLET ORAL ONCE
Refills: 0 | Status: DISCONTINUED | OUTPATIENT
Start: 2025-05-02 | End: 2025-05-02

## 2025-05-02 RX ORDER — SODIUM CHLORIDE 9 G/1000ML
1000 INJECTION, SOLUTION INTRAVENOUS
Refills: 0 | Status: ACTIVE | OUTPATIENT
Start: 2025-05-02 | End: 2026-03-31

## 2025-05-02 RX ORDER — AMLODIPINE BESYLATE 10 MG/1
1 TABLET ORAL
Refills: 0 | DISCHARGE

## 2025-05-02 RX ORDER — SEMAGLUTIDE 1 MG/.5ML
1 INJECTION, SOLUTION SUBCUTANEOUS
Refills: 0 | DISCHARGE

## 2025-05-02 RX ORDER — GABAPENTIN 400 MG/1
2 CAPSULE ORAL
Refills: 0 | DISCHARGE

## 2025-05-02 RX ORDER — APREPITANT 40 MG/1
40 CAPSULE ORAL ONCE
Refills: 0 | Status: COMPLETED | OUTPATIENT
Start: 2025-05-02 | End: 2025-05-02

## 2025-05-02 RX ORDER — HYDROMORPHONE/SOD CHLOR,ISO/PF 2 MG/10 ML
1 SYRINGE (ML) INJECTION
Refills: 0 | Status: DISCONTINUED | OUTPATIENT
Start: 2025-05-02 | End: 2025-05-02

## 2025-05-02 RX ADMIN — Medication 0.5 MILLIGRAM(S): at 11:52

## 2025-05-02 RX ADMIN — Medication 0.5 MILLIGRAM(S): at 12:21

## 2025-05-02 RX ADMIN — SODIUM CHLORIDE 75 MILLILITER(S): 9 INJECTION, SOLUTION INTRAVENOUS at 11:51

## 2025-05-02 RX ADMIN — OXYCODONE HYDROCHLORIDE 5 MILLIGRAM(S): 30 TABLET ORAL at 12:45

## 2025-05-02 RX ADMIN — OXYCODONE HYDROCHLORIDE 5 MILLIGRAM(S): 30 TABLET ORAL at 12:20

## 2025-05-02 RX ADMIN — APREPITANT 40 MILLIGRAM(S): 40 CAPSULE ORAL at 09:10

## 2025-05-02 RX ADMIN — Medication 1 APPLICATION(S): at 09:09

## 2025-05-02 NOTE — ASU DISCHARGE PLAN (ADULT/PEDIATRIC) - FINANCIAL ASSISTANCE
[facility] provides services at a reduced cost to those who are determined to be eligible through [facility]’s financial assistance program. Information regarding [facility]’s financial assistance program can be found by going to [email or web address] or by calling [phone].

## 2025-05-02 NOTE — ASU DISCHARGE PLAN (ADULT/PEDIATRIC) - NS MD DC FALL RISK RISK
For information on Fall & Injury Prevention, visit: [email or web address] OR  [email or web address] OR  https://www.cdc.gov/steadi/patient.html

## 2025-06-14 NOTE — ASU PREOP CHECKLIST - IV STARTED
Follow-up with your PCP and pulmonology - Our Emergency Department Referral Coordinators will be reaching out to you in the next 24-48 hours from 9:00am to 5:00pm to schedule a follow up appointment. Please expect a phone call from the hospital in that time frame. If you do not receive a call or if you have any questions or concerns, you can reach them at   [phone].    Cough    Coughing is a reflex that clears your throat and your airways. Coughing helps to heal and protect your lungs. It is normal to cough occasionally, but a cough that happens with other symptoms or lasts a long time may be a sign of a condition that needs treatment. Coughing may be caused by infections, asthma or COPD, smoking, postnasal drip, gastroesophageal reflux, as well as other medical conditions. Take medicines only as instructed by your health care provider. Avoid environments or triggers that causes you to cough at work or at home.    SEEK IMMEDIATE MEDICAL CARE IF YOU HAVE ANY OF THE FOLLOWING SYMPTOMS: coughing up blood, shortness of breath, rapid heart rate, chest pain, unexplained weight loss or night sweats.
left fa/yes

## 2025-06-26 ENCOUNTER — APPOINTMENT (OUTPATIENT)
Dept: OPHTHALMOLOGY | Facility: CLINIC | Age: 50
End: 2025-06-26
Payer: MEDICARE

## 2025-06-26 ENCOUNTER — NON-APPOINTMENT (OUTPATIENT)
Age: 50
End: 2025-06-26

## 2025-06-26 PROCEDURE — 92285 EXTERNAL OCULAR PHOTOGRAPHY: CPT

## 2025-06-26 PROCEDURE — 99213 OFFICE O/P EST LOW 20 MIN: CPT | Mod: 25

## (undated) DEVICE — SYR LUER LOK 10CC

## (undated) DEVICE — BLADE SCALPEL SAFETYLOCK #11

## (undated) DEVICE — DRAPE NEPHROSCOPY 72X118"

## (undated) DEVICE — SPECIMEN CONTAINER 8OZ W LID

## (undated) DEVICE — SOL IRR POUR H2O 1000ML

## (undated) DEVICE — DRSG KLING 4"

## (undated) DEVICE — GLV 7.5 PROTEXIS (WHITE)

## (undated) DEVICE — PROTECTOR CORNEAL BLUE ADULT

## (undated) DEVICE — DRSG CURITY GAUZE SPONGE 4 X 4" 12-PLY

## (undated) DEVICE — PACK SMR

## (undated) DEVICE — PACK UPPER EXTREMITY

## (undated) DEVICE — SOL IRR POUR NS 0.9% 1000ML

## (undated) DEVICE — ELCTR REM POLYHESIVE ADULT PT RETURN 15FT

## (undated) DEVICE — APPLICATOR COTTON TIP 3" STERILE

## (undated) DEVICE — DRSG KLING 3"

## (undated) DEVICE — DRAPE TOWEL BLUE 17" X 24"

## (undated) DEVICE — NDL COUNTER FOAM AND MAGNET 10-20

## (undated) DEVICE — DRAPE SPLIT SHEET 77" X 120"

## (undated) DEVICE — ELCTR E/S NEEDLE 0.75"

## (undated) DEVICE — GOWN XL

## (undated) DEVICE — VENODYNE/SCD SLEEVE CALF MEDIUM

## (undated) DEVICE — DRSG ACE BANDAGE 4"

## (undated) DEVICE — SLING ARM CHIEFTAIN MESH LARGE

## (undated) DEVICE — GLV 7.5 PROTEXIS (BLUE)

## (undated) DEVICE — WARMING BLANKET LOWER ADULT

## (undated) DEVICE — SUT POLYSORB 5-0 18" P-13 UNDYED

## (undated) DEVICE — DRSG TAPE TRANSPORE 1"

## (undated) DEVICE — ELCTR BOVIE PENCIL HANDPIECE

## (undated) DEVICE — DRAPE 3/4 SHEET 52X76"

## (undated) DEVICE — LABELS BLANK W PEN

## (undated) DEVICE — NDL HYPO SAFE 25G X 5/8" (ORANGE)

## (undated) DEVICE — DRSG STERISTRIPS 0.5 X 4"

## (undated) DEVICE — DRAPE 1/2 SHEET 40X57"